# Patient Record
Sex: MALE | URBAN - METROPOLITAN AREA
[De-identification: names, ages, dates, MRNs, and addresses within clinical notes are randomized per-mention and may not be internally consistent; named-entity substitution may affect disease eponyms.]

---

## 2018-06-13 ENCOUNTER — IMPORTED ENCOUNTER (OUTPATIENT)
Dept: URBAN - METROPOLITAN AREA CLINIC 9 | Facility: CLINIC | Age: 33
End: 2018-06-13

## 2019-11-19 NOTE — PATIENT DISCUSSION
New Prescription: Maxitrol (neomycin-polymyxin-dexameth): ointment: 3.5-10,000-0.1 mg-unit/g-% 1 a small amount three times a day as directed into left eye 11-

## 2019-12-13 NOTE — PATIENT DISCUSSION
Continue: Maxitrol (neomycin-polymyxin-dexameth): ointment: 3.5-10,000-0.1 mg-unit/g-% 1 a small amount three times a day as directed into left eye 11-

## 2020-01-21 NOTE — PATIENT DISCUSSION
New Prescription: doxycycline hyclate (doxycycline hyclate): tablet: 100 mg 1 tablet once a day as directed by mouth 01-

## 2020-01-21 NOTE — PATIENT DISCUSSION
Stopped Today: doxycycline hyclate (doxycycline hyclate): tablet: 100 mg 1 tablet twice a day as directed by mouth 01-

## 2020-01-21 NOTE — PATIENT DISCUSSION
New Prescription: Maxitrol (neomycin-polymyxin-dexameth): ointment: 3.5-10,000-0.1 mg-unit/g-% 1 a small amount at bedtime as directed into left eye 01-

## 2020-01-21 NOTE — PATIENT DISCUSSION
CHALAZION OS: PRESCRIBED WARM COMPRESSES, EYELID SCRUBS AND DOXYCYCLINE 100MG QDAY PO X 1 MONTH AND CONTINUE MAXITROL GIOVANNI QHS X 2 WEEKS.

## 2020-01-21 NOTE — PATIENT DISCUSSION
New Prescription: TobraDex (tobramycin-dexamethasone): ointment: 0.3-0.1% 1 a small amount at bedtime as directed into both eyes 01-

## 2021-10-16 ASSESSMENT — VISUAL ACUITY
OD_CC: 20/20 SN
OS_CC: 20/20 SN
OS_SC: 20/25 SN
OD_SC: 20/20 SN

## 2021-10-16 ASSESSMENT — KERATOMETRY
OD_K1POWER_DIOPTERS: 42.75
OS_AXISANGLE_DEGREES: 175
OS_K2POWER_DIOPTERS: 44.5
OS_AXISANGLE2_DEGREES: 85
OD_K2POWER_DIOPTERS: 43.75
OS_K1POWER_DIOPTERS: 43
OD_AXISANGLE2_DEGREES: 92
OD_AXISANGLE_DEGREES: 2